# Patient Record
Sex: FEMALE | HISPANIC OR LATINO | Employment: PART TIME | URBAN - METROPOLITAN AREA
[De-identification: names, ages, dates, MRNs, and addresses within clinical notes are randomized per-mention and may not be internally consistent; named-entity substitution may affect disease eponyms.]

---

## 2023-03-11 ENCOUNTER — OFFICE VISIT (OUTPATIENT)
Dept: FAMILY MEDICINE | Facility: CLINIC | Age: 53
End: 2023-03-11
Payer: COMMERCIAL

## 2023-03-11 VITALS
OXYGEN SATURATION: 98 % | HEART RATE: 90 BPM | DIASTOLIC BLOOD PRESSURE: 86 MMHG | SYSTOLIC BLOOD PRESSURE: 122 MMHG | TEMPERATURE: 98.8 F

## 2023-03-11 DIAGNOSIS — K64.8 INTERNAL HEMORRHOID: Primary | ICD-10-CM

## 2023-03-11 PROCEDURE — 99203 OFFICE O/P NEW LOW 30 MIN: CPT

## 2023-03-11 RX ORDER — OMEGA-3 FATTY ACIDS/FISH OIL 300-1000MG
CAPSULE ORAL
COMMUNITY

## 2023-03-11 RX ORDER — ROSUVASTATIN CALCIUM 5 MG/1
5 TABLET, COATED ORAL DAILY
COMMUNITY

## 2023-03-11 RX ORDER — ERGOCALCIFEROL (VITAMIN D2) 10 MCG
TABLET ORAL
COMMUNITY

## 2023-03-11 RX ORDER — HYDROCORTISONE ACETATE 25 MG/1
25 SUPPOSITORY RECTAL 2 TIMES DAILY PRN
Qty: 10 SUPPOSITORY | Refills: 0 | Status: SHIPPED | OUTPATIENT
Start: 2023-03-11

## 2023-03-11 ASSESSMENT — ENCOUNTER SYMPTOMS
RESPIRATORY NEGATIVE: 1
HEMATOCHEZIA: 1
CONSTIPATION: 1
ABDOMINAL DISTENTION: 0
CONSTITUTIONAL NEGATIVE: 1
VOMITING: 0
RECTAL PAIN: 0
DIARRHEA: 1
NAUSEA: 0
CARDIOVASCULAR NEGATIVE: 1

## 2023-03-11 NOTE — PATIENT INSTRUCTIONS
Call on Monday for an appointment at the [facility] for a recheck in 1-2 weeks on today's visit

## 2023-03-11 NOTE — PROGRESS NOTES
Assessment & Plan     Internal hemorrhoid  Is new to [place] and has not established care yet with anybody.  Internal medicine referral given prefers the [facility] clinic.  Is due for colonoscopy since she has never had one.    - hydrocortisone (ANUSOL-HC) 25 MG suppository; Place 1 suppository (25 mg) rectally 2 times daily as needed for hemorrhoids  - Internal Medicine Referral; Future    At the end of the encounter, I discussed  diagnosis, medications. Discussed red flags for immediate return to clinic/ER, as well as indications for follow up if no improvement. Patient understood and agreed to plan. Patient was stable for discharge.    25 minutes spent on the date of the encounter doing chart review, patient visit and documentation        See Patient Instructions    Return for Follow up in 1-2 weeks for recheck with primary care.    [facility]    Kurt Muñoz is a 52 year old accompanied by her spouse, presenting for the following health issues:  Urgent Care and Abdominal Cramping (Lower abdominal cramping. It took Peptoid yesterday and this morning and not too long ago notice blood in her stool.  )      HPI     Presents with bright red blood per rectum twice today along with mild lower pelvic cramping.  Is new to clinic and just moved to [place].  No history of IBD personal or family.  No history of colon cancer personally or family.  Does have a history of hemorrhoids but has not had a flare since she was pregnant.  Last night she and her  went out to eat, they shared shared a meal together.   has not been feeling ill.  She had a normal stool this morning but had to strain to get it out,  then 2 diarrhea stools today accompanied with small amount BRBPR.   No fever, nausea or vomiting.   No no chronic medical issues other than elevated cholesterol that she is on Crestor for.     Review of Systems    Constitutional: Negative.    Respiratory: Negative.    Cardiovascular: Negative.    Gastrointestinal: Positive for constipation, diarrhea and hematochezia. Negative for abdominal distention, nausea, rectal pain and vomiting.   Genitourinary: Negative.             Objective    /86   Pulse 90   Temp 98.8  F (37.1  C) (Tympanic)   SpO2 98%   There is no height or weight on file to calculate BMI.  Physical Exam   GENERAL: healthy, alert and no distress  RESP: lungs clear to auscultation - no rales, rhonchi or wheezes  CV: regular rates and rhythm, normal S1 S2, no S3 or S4 and no murmur, click or rub  ABDOMEN: soft, nontender, no hepatosplenomegaly, no masses and bowel sounds normal  RECTAL (female): non bleeding internal hemorrhoid on anal scope exam. Skin tag noted.  Guaiac not done.